# Patient Record
Sex: MALE | Race: WHITE
[De-identification: names, ages, dates, MRNs, and addresses within clinical notes are randomized per-mention and may not be internally consistent; named-entity substitution may affect disease eponyms.]

---

## 2019-11-07 ENCOUNTER — HOSPITAL ENCOUNTER (OUTPATIENT)
Dept: HOSPITAL 95 - ORSCSDS | Age: 49
Discharge: HOME | End: 2019-11-07
Attending: INTERNAL MEDICINE
Payer: COMMERCIAL

## 2019-11-07 VITALS — WEIGHT: 236.34 LBS | HEIGHT: 72.99 IN | BODY MASS INDEX: 31.32 KG/M2

## 2019-11-07 DIAGNOSIS — R10.13: ICD-10-CM

## 2019-11-07 DIAGNOSIS — I10: ICD-10-CM

## 2019-11-07 DIAGNOSIS — G47.33: ICD-10-CM

## 2019-11-07 DIAGNOSIS — Z79.899: ICD-10-CM

## 2019-11-07 DIAGNOSIS — K31.7: ICD-10-CM

## 2019-11-07 DIAGNOSIS — K21.9: ICD-10-CM

## 2019-11-07 DIAGNOSIS — Z79.01: ICD-10-CM

## 2019-11-07 DIAGNOSIS — D13.2: Primary | ICD-10-CM

## 2019-11-07 DIAGNOSIS — F41.8: ICD-10-CM

## 2019-11-07 DIAGNOSIS — E66.9: ICD-10-CM

## 2019-11-07 PROCEDURE — 0DB88ZX EXCISION OF SMALL INTESTINE, VIA NATURAL OR ARTIFICIAL OPENING ENDOSCOPIC, DIAGNOSTIC: ICD-10-PCS | Performed by: INTERNAL MEDICINE

## 2019-11-07 PROCEDURE — 0DB98ZX EXCISION OF DUODENUM, VIA NATURAL OR ARTIFICIAL OPENING ENDOSCOPIC, DIAGNOSTIC: ICD-10-PCS | Performed by: INTERNAL MEDICINE

## 2019-11-07 PROCEDURE — 0DB68ZX EXCISION OF STOMACH, VIA NATURAL OR ARTIFICIAL OPENING ENDOSCOPIC, DIAGNOSTIC: ICD-10-PCS | Performed by: INTERNAL MEDICINE

## 2019-11-07 NOTE — NUR
11/07/19 1701 Sarina Gray
PT. WITH BÁRBARA. PT. WAS BEING SEDATED & THEN NEEDED EXTRA NURSE FOR JAW
THRUST. PT. THEN WOULD MOVE. PER DR. GUZMAN SEE IF ANESTHESIA WAS
AVAILABLE TO HELP WITH CASE. DR. SÁNCHEZ IN ROOM. PT. GIVEN EXTRA MEDS, SEE
ANESTHESIA RECORD. ALSO UPPER ENDOSCOPY WAS CHANGED TO A PUSH
ENTEROSCOPY.

## 2020-01-16 ENCOUNTER — HOSPITAL ENCOUNTER (OUTPATIENT)
Dept: HOSPITAL 95 - LAB SHORT | Age: 50
Discharge: HOME | End: 2020-01-16
Attending: OTOLARYNGOLOGY
Payer: COMMERCIAL

## 2020-01-16 DIAGNOSIS — D10.39: Primary | ICD-10-CM

## 2020-01-16 DIAGNOSIS — D37.05: ICD-10-CM

## 2020-02-12 ENCOUNTER — HOSPITAL ENCOUNTER (OUTPATIENT)
Dept: HOSPITAL 95 - ORSCSDS | Age: 50
Discharge: HOME | End: 2020-02-12
Attending: OTOLARYNGOLOGY
Payer: COMMERCIAL

## 2020-02-12 VITALS — HEIGHT: 72.99 IN | BODY MASS INDEX: 31.73 KG/M2 | WEIGHT: 239.42 LBS

## 2020-02-12 DIAGNOSIS — G47.33: ICD-10-CM

## 2020-02-12 DIAGNOSIS — Z79.899: ICD-10-CM

## 2020-02-12 DIAGNOSIS — J34.3: ICD-10-CM

## 2020-02-12 DIAGNOSIS — J34.2: Primary | ICD-10-CM

## 2020-02-12 DIAGNOSIS — I10: ICD-10-CM

## 2020-02-12 PROCEDURE — 09BM3ZZ EXCISION OF NASAL SEPTUM, PERCUTANEOUS APPROACH: ICD-10-PCS | Performed by: OTOLARYNGOLOGY

## 2020-02-12 PROCEDURE — 09TL7ZZ RESECTION OF NASAL TURBINATE, VIA NATURAL OR ARTIFICIAL OPENING: ICD-10-PCS | Performed by: OTOLARYNGOLOGY

## 2020-02-12 NOTE — NUR
02/12/20 1453 Andreia Murphy
AFTER PT GOT DRESSED, PT C/O BILAT CRAMPING IN BOTH CALVES. PT STATES
CRAMPING IS MILD. DR DIEHL AWARE AND AT BEDSIDE TO TALK TO PT AT 1439.
PT INSTRUCTED TO GOT TO ER IF PAIN WORSENS OR DOES NOT RESOLVE. PT
INSTRUCTED TO REPLENISH FLUIDS/ELECTROLYTES, MASSAGE CALVES, AND
STRETCH CALVES TO HELP ALLEVIATE PAIN.